# Patient Record
Sex: MALE | Race: WHITE | Employment: FULL TIME | URBAN - METROPOLITAN AREA
[De-identification: names, ages, dates, MRNs, and addresses within clinical notes are randomized per-mention and may not be internally consistent; named-entity substitution may affect disease eponyms.]

---

## 2023-06-19 ENCOUNTER — HOSPITAL ENCOUNTER (OUTPATIENT)
Dept: SLEEP CENTER | Age: 38
Discharge: HOME | End: 2023-06-19
Payer: COMMERCIAL

## 2023-06-19 DIAGNOSIS — G47.33 OSA (OBSTRUCTIVE SLEEP APNEA): Primary | ICD-10-CM

## 2023-06-19 PROCEDURE — 95800 SLP STDY UNATTENDED: CPT

## 2025-03-09 ENCOUNTER — HOSPITAL ENCOUNTER (EMERGENCY)
Age: 40
Discharge: HOME | End: 2025-03-09
Payer: COMMERCIAL

## 2025-03-09 ENCOUNTER — APPOINTMENT (OUTPATIENT)
Dept: GENERAL RADIOLOGY | Age: 40
End: 2025-03-09
Payer: COMMERCIAL

## 2025-03-09 VITALS
HEART RATE: 79 BPM | SYSTOLIC BLOOD PRESSURE: 162 MMHG | TEMPERATURE: 98 F | DIASTOLIC BLOOD PRESSURE: 113 MMHG | OXYGEN SATURATION: 98 % | RESPIRATION RATE: 18 BRPM

## 2025-03-09 DIAGNOSIS — S61.213A LACERATION OF LEFT MIDDLE FINGER WITHOUT FOREIGN BODY WITHOUT DAMAGE TO NAIL, INITIAL ENCOUNTER: ICD-10-CM

## 2025-03-09 DIAGNOSIS — T14.8XXA AVULSION FRACTURE: Primary | ICD-10-CM

## 2025-03-09 DIAGNOSIS — M20.012 MALLET FINGER, LEFT: ICD-10-CM

## 2025-03-09 PROCEDURE — 73130 X-RAY EXAM OF HAND: CPT

## 2025-03-09 PROCEDURE — 90714 TD VACC NO PRESV 7 YRS+ IM: CPT | Performed by: PHYSICIAN ASSISTANT

## 2025-03-09 PROCEDURE — 90471 IMMUNIZATION ADMIN: CPT | Performed by: PHYSICIAN ASSISTANT

## 2025-03-09 PROCEDURE — 6370000000 HC RX 637 (ALT 250 FOR IP): Performed by: NURSE PRACTITIONER

## 2025-03-09 PROCEDURE — 99284 EMERGENCY DEPT VISIT MOD MDM: CPT

## 2025-03-09 PROCEDURE — 6360000002 HC RX W HCPCS: Performed by: PHYSICIAN ASSISTANT

## 2025-03-09 RX ORDER — BACITRACIN ZINC 500 [USP'U]/G
OINTMENT TOPICAL ONCE
Status: COMPLETED | OUTPATIENT
Start: 2025-03-09 | End: 2025-03-09

## 2025-03-09 RX ADMIN — BACITRACIN ZINC: 500 OINTMENT TOPICAL at 19:40

## 2025-03-09 RX ADMIN — CLOSTRIDIUM TETANI TOXOID ANTIGEN (FORMALDEHYDE INACTIVATED) AND CORYNEBACTERIUM DIPHTHERIAE TOXOID ANTIGEN (FORMALDEHYDE INACTIVATED) 0.5 ML: 5; 2 INJECTION, SUSPENSION INTRAMUSCULAR at 18:42

## 2025-03-09 NOTE — ED PROVIDER NOTES
laboratory and radiology results have been personally reviewed by myself   LABS:  No results found for this visit on 03/09/25.    RADIOLOGY:  Interpreted by Radiologist.  XR HAND LEFT (MIN 3 VIEWS)   Final Result   Avulsion fracture of the volar articular base of the middle phalanx of the   left 3rd finger, location of the volar plate.  Please correlate clinically.      Mild soft tissue swelling in the mid proximal aspect of the left 3rd finger.             ------------------------- NURSING NOTES AND VITALS REVIEWED ---------------------------   The nursing notes within the ED encounter and vital signs as below have been reviewed.   BP (!) 162/113   Pulse 79   Temp 98 °F (36.7 °C)   Resp 18   SpO2 98%   Oxygen Saturation Interpretation: Normal      ---------------------------------------------------PHYSICAL EXAM--------------------------------------      Constitutional/General: Alert and oriented x3, well appearing, non toxic in NAD  Head: Normocephalic and atraumatic  Eyes: PERRL, EOMI  Mouth: Oropharynx clear, handling secretions, no trismus  Neck: Supple, full ROM,   Pulmonary: Lungs clear to auscultation bilaterally, no wheezes, rales, or rhonchi. Not in respiratory distress  Cardiovascular:  Regular rate and rhythm, no murmurs, gallops, or rubs. 2+ distal pulses  Abdomen: Soft, non tender, non distended,   Extremities: Moves all extremities x 4. Warm and well perfused  Skin: warm and dry without rash. There is a laceration of the left middle finger distal aspect , which measures 0.1cm. It is a superficial  thickness laceration. There is no evidence of foreign body or gross contamination.  On exam he does have bent upward flexion of the left hand distal third finger unable to perform full flexion.  Suspect tendon injury.  Will apply splint.  Neurologic: GCS 15,  Psych: Normal Affect      ------------------------------ ED COURSE/MEDICAL DECISION MAKING----------------------  Medications   tetanus & diphtheria

## 2025-03-09 NOTE — DISCHARGE INSTRUCTIONS
Apply dressing and splint.  Cleanse area daily with warm Dial soap and water and apply bacitracin dressing then afterwards.  Follow-up with [facility] physician as well as orthopedics.  Take Motrin or Tylenol for additional pain relief.

## 2025-03-10 ENCOUNTER — TELEPHONE (OUTPATIENT)
Dept: ORTHOPEDIC SURGERY | Age: 40
End: 2025-03-10

## 2025-03-10 NOTE — TELEPHONE ENCOUNTER
Patient called office requesting appointment for ED follow up.    ED visit date: 03/09/25    ED Location: [facility] ED    Diagnosis/Injury: Avulsion fracture of the volar articular base of the middle phalanx of the left 3rd finger, location of the volar plate.       Provider on call: Dr. Henry Perez MD    Routed to providers for recommendations.    No future appointments.

## 2025-03-12 NOTE — TELEPHONE ENCOUNTER
Pt called back. He would like to follow with [facility] in the [facility] [facility]. I will fwd over all his info. He states this is a workmans comp claim

## 2025-03-14 NOTE — TELEPHONE ENCOUNTER
Received C9 form from Nasim Harmon. I l/m for him to notify that patient was referred elsewhere. C9 scanned in

## 2025-05-16 NOTE — PROGRESS NOTES
OCCUPATIONAL THERAPY INITIAL EVALUATION    [facility] OCCUPATIONAL THERAPY   [place] [address]  Dept: [phone]  Loc: [phone]   [facility] OT Fax: [phone]    Date:  2025  Initial Evaluation Date: 25     Evaluating Therapist: Clarice Santos OT    Patient Name:  Artie Coleman    :  1985    Restrictions/Precautions:  Activity as tolerated, Low fall risk  Diagnosis:    S62.633A (ICD-10-CM) - Displaced fracture of distal phalanx of left middle finger, initial encounter for closed fracture   S61.213A (ICD-10-CM) - Laceration without foreign body of left middle finger without damage to nail, initial encounter   Date of Surgery/Injury: 3-9-25    Insurance/Certification information: [facility]- # [id number]  Plan of care signed (Y/N): N  Visit# / total visits:  visits ( thru 25)    Referring Practitioner: Manfred Petersen PA-C  Specific Practitioner Orders: OT evaluate and treat    Assessment of current deficits   [] Functional mobility  [] ADLs  [x] Strength   [] Cognition   [] Functional transfers   [] IADLs  [] Safety Awareness  [] Endurance   [] Fine Motor Coordination  [] Balance  [] Vision/perception  [] Sensation    [] Gross Motor Coordination [] ROM  [] Pain   [] Edema    [] Scar Adhesion/Skin Integrity     OT EVALUATION SUMMARY   The pt is doing very well. No residual issues are present with digital ROM and functional hand use. There is a mild decrease in strength in the effected hand but it is expected to improve on its own as activity continues to increase. No reports of pain or paresthesias are present. He is currently back to work with no restrictions and denies functional hand issues at work. At this point, no OT intervention is recommended.     ________________________________________________________________________________________________      Past Medical History: No past medical history on

## 2025-05-19 ENCOUNTER — EVALUATION (OUTPATIENT)
Dept: OCCUPATIONAL THERAPY | Age: 40
End: 2025-05-19

## 2025-05-19 DIAGNOSIS — S61.213A LACERATION WITHOUT FOREIGN BODY OF LEFT MIDDLE FINGER WITHOUT DAMAGE TO NAIL, INITIAL ENCOUNTER: ICD-10-CM

## 2025-05-19 DIAGNOSIS — S62.633A DISPLACED FRACTURE OF DISTAL PHALANX OF LEFT MIDDLE FINGER, INITIAL ENCOUNTER FOR CLOSED FRACTURE: Primary | ICD-10-CM

## 2025-05-20 PROBLEM — S61.213A LACERATION WITHOUT FOREIGN BODY OF LEFT MIDDLE FINGER WITHOUT DAMAGE TO NAIL, INITIAL ENCOUNTER: Status: ACTIVE | Noted: 2025-05-20

## 2025-05-20 PROBLEM — S62.633A: Status: ACTIVE | Noted: 2025-05-20
